# Patient Record
Sex: MALE | Race: BLACK OR AFRICAN AMERICAN | NOT HISPANIC OR LATINO | Employment: PART TIME | URBAN - NONMETROPOLITAN AREA
[De-identification: names, ages, dates, MRNs, and addresses within clinical notes are randomized per-mention and may not be internally consistent; named-entity substitution may affect disease eponyms.]

---

## 2024-02-24 ENCOUNTER — OFFICE VISIT (OUTPATIENT)
Dept: URGENT CARE | Facility: CLINIC | Age: 29
End: 2024-02-24
Payer: COMMERCIAL

## 2024-02-24 ENCOUNTER — APPOINTMENT (OUTPATIENT)
Dept: RADIOLOGY | Facility: CLINIC | Age: 29
End: 2024-02-24
Payer: COMMERCIAL

## 2024-02-24 VITALS
DIASTOLIC BLOOD PRESSURE: 75 MMHG | OXYGEN SATURATION: 97 % | RESPIRATION RATE: 18 BRPM | SYSTOLIC BLOOD PRESSURE: 123 MMHG | HEART RATE: 62 BPM | TEMPERATURE: 98 F

## 2024-02-24 DIAGNOSIS — M54.2 NECK PAIN: ICD-10-CM

## 2024-02-24 DIAGNOSIS — S16.1XXA STRAIN OF NECK MUSCLE, INITIAL ENCOUNTER: Primary | ICD-10-CM

## 2024-02-24 DIAGNOSIS — S06.0X0A CONCUSSION WITHOUT LOSS OF CONSCIOUSNESS, INITIAL ENCOUNTER: ICD-10-CM

## 2024-02-24 PROCEDURE — 72040 X-RAY EXAM NECK SPINE 2-3 VW: CPT

## 2024-02-24 PROCEDURE — G0382 LEV 3 HOSP TYPE B ED VISIT: HCPCS | Performed by: PHYSICIAN ASSISTANT
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
## 2024-02-24 NOTE — LETTER
February 24, 2024     Patient: Misty Sheehan   YOB: 1995   Date of Visit: 2/24/2024       To Whom It May Concern:    Please excuse Misty Sheehan from work until 2/27/2024. He may return sooner if symptoms improve.     If you have any questions or concerns, please don't hesitate to call.         Sincerely,        Jazmin De La Paz PA-C    CC: No Recipients

## 2024-02-24 NOTE — PATIENT INSTRUCTIONS
Aleve twice per day for pain as needed  Ice 15 minutes on/off at least 4 times per day  Tylenol for breakthrough pain  Cognitive rest  Follow up with sports medicine for concussion therapy  Follow up with PCP in 3-5 days.  Proceed to  ER if symptoms worsen.

## 2024-03-13 VITALS
DIASTOLIC BLOOD PRESSURE: 70 MMHG | SYSTOLIC BLOOD PRESSURE: 114 MMHG | HEIGHT: 72 IN | BODY MASS INDEX: 29.39 KG/M2 | WEIGHT: 217 LBS | HEART RATE: 68 BPM

## 2024-03-13 DIAGNOSIS — M54.2 NECK PAIN: Primary | ICD-10-CM

## 2024-03-13 DIAGNOSIS — S16.1XXA STRAIN OF NECK MUSCLE, INITIAL ENCOUNTER: ICD-10-CM

## 2024-03-13 PROCEDURE — 99204 OFFICE O/P NEW MOD 45 MIN: CPT | Performed by: FAMILY MEDICINE
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
## 2024-03-19 ENCOUNTER — HOSPITAL ENCOUNTER (OUTPATIENT)
Dept: MRI IMAGING | Facility: HOSPITAL | Age: 29
Discharge: HOME | End: 2024-03-19
Attending: FAMILY MEDICINE
Payer: COMMERCIAL

## 2024-03-19 DIAGNOSIS — M54.2 NECK PAIN: ICD-10-CM

## 2024-03-19 PROCEDURE — 72141 MRI NECK SPINE W/O DYE: CPT

## 2024-03-28 ENCOUNTER — TELEPHONE (OUTPATIENT)
Dept: OBGYN CLINIC | Facility: CLINIC | Age: 29
End: 2024-03-28

## 2024-03-28 DIAGNOSIS — S16.1XXA STRAIN OF NECK MUSCLE, INITIAL ENCOUNTER: Primary | ICD-10-CM

## 2024-03-28 NOTE — TELEPHONE ENCOUNTER
Please notify patient that mri of the c spine revealed findings consistent with muscle spasm. Would recommend physical therapy for the cervicval spine. Referral was placed

## 2024-05-15 ENCOUNTER — OFFICE VISIT (OUTPATIENT)
Dept: URGENT CARE | Facility: CLINIC | Age: 29
End: 2024-05-15
Payer: COMMERCIAL

## 2024-05-15 VITALS
HEIGHT: 72 IN | WEIGHT: 211.2 LBS | DIASTOLIC BLOOD PRESSURE: 65 MMHG | HEART RATE: 69 BPM | RESPIRATION RATE: 18 BRPM | OXYGEN SATURATION: 98 % | TEMPERATURE: 97.9 F | BODY MASS INDEX: 28.61 KG/M2 | SYSTOLIC BLOOD PRESSURE: 121 MMHG

## 2024-05-15 DIAGNOSIS — M47.12 SPONDYLOSIS OF CERVICAL JOINT WITH MYELOPATHY: Primary | ICD-10-CM

## 2024-05-15 PROCEDURE — G0382 LEV 3 HOSP TYPE B ED VISIT: HCPCS | Performed by: PHYSICIAN ASSISTANT

## 2024-05-15 NOTE — LETTER
May 15, 2024     Patient: Misty Sheehan   YOB: 1995   Date of Visit: 5/15/2024       To Whom it May Concern:    Misty Sheehan was seen in my clinic on 5/15/2024. He may return to work on 05/21/2024 .    If you have any questions or concerns, please don't hesitate to call.         Sincerely,          El Fontenot PA-C        CC: No Recipients
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
## 2024-06-18 ENCOUNTER — EVALUATION (OUTPATIENT)
Dept: PHYSICAL THERAPY | Facility: CLINIC | Age: 29
End: 2024-06-18
Payer: COMMERCIAL

## 2024-06-18 DIAGNOSIS — M54.2 CERVICALGIA: ICD-10-CM

## 2024-06-18 DIAGNOSIS — S16.1XXD STRAIN, CERVICAL, SUBSEQUENT ENCOUNTER: Primary | ICD-10-CM

## 2024-06-18 PROCEDURE — 97161 PT EVAL LOW COMPLEX 20 MIN: CPT | Performed by: PHYSICAL THERAPIST

## 2024-06-18 PROCEDURE — 97140 MANUAL THERAPY 1/> REGIONS: CPT | Performed by: PHYSICAL THERAPIST
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
## 2024-06-20 ENCOUNTER — OFFICE VISIT (OUTPATIENT)
Dept: PHYSICAL THERAPY | Facility: CLINIC | Age: 29
End: 2024-06-20
Payer: COMMERCIAL

## 2024-06-20 DIAGNOSIS — S16.1XXD STRAIN, CERVICAL, SUBSEQUENT ENCOUNTER: Primary | ICD-10-CM

## 2024-06-20 DIAGNOSIS — M54.2 CERVICALGIA: ICD-10-CM

## 2024-06-20 PROCEDURE — 97110 THERAPEUTIC EXERCISES: CPT | Performed by: PHYSICAL THERAPIST

## 2024-06-20 PROCEDURE — 97140 MANUAL THERAPY 1/> REGIONS: CPT | Performed by: PHYSICAL THERAPIST

## 2024-06-20 PROCEDURE — 97112 NEUROMUSCULAR REEDUCATION: CPT | Performed by: PHYSICAL THERAPIST

## 2024-06-20 NOTE — PROGRESS NOTES
"Daily Note     Today's date: 2024  Patient name: Misty Sheehan  : 1995  MRN: [id number]  Referring provider: Seymour Sen DO  Dx:   Encounter Diagnosis     ICD-10-CM    1. Strain, cervical, subsequent encounter  S16.1XXD       2. Cervicalgia  M54.2           Start Time: 1145  Stop Time: 1230  Total time in clinic (min): 45 minutes    Subjective: Patient reports fatigue entering therapy today. He notes that his neck is painful and that he continues to have difficulties with looking down and turning his head. He also finds that lifting heavier items is also difficult for him.       Objective: See treatment diary below      Assessment: Tolerated treatment fair. Patient demonstrated fatigue post treatment, exhibited good technique with therapeutic exercises, and would benefit from continued PT Patient observed in cervical flexion upon at start of clinic today. Patient remarks on increased cervical discomfort during therapy interventions today but was able to complete all interventions asked of him today. He noted greatest discomfort to the R suprascapular region throughout his session today. All passive stretching was performed to patient tolerance confirmed verbally for comfort.       Plan: Continue per plan of care.  Progress treatment as tolerated.       Precautions: None      Date  IE       FOTO 24 SC       Manuals        Cervical PROM 5 min 5 min      SOR 3 min 3 min                      Neuro Re-Ed        Cervical retraction  5\" 15x      No Monies  OTB 10\" 10x                                              Ther Ex        Cervical AROM  10x ea.       UBE for mobility and strengthening  L1 4 min fwd      Cervical isometrics  5\" 10x      Corner pec stretch  10\" 10x                              Posture education 5  min       Ther Activity                        Gait Training                        Modalities                               "

## 2024-06-26 ENCOUNTER — APPOINTMENT (OUTPATIENT)
Dept: PHYSICAL THERAPY | Facility: CLINIC | Age: 29
End: 2024-06-26
Payer: COMMERCIAL

## 2024-06-28 ENCOUNTER — OFFICE VISIT (OUTPATIENT)
Dept: PHYSICAL THERAPY | Facility: CLINIC | Age: 29
End: 2024-06-28
Payer: COMMERCIAL

## 2024-06-28 DIAGNOSIS — S16.1XXD STRAIN, CERVICAL, SUBSEQUENT ENCOUNTER: ICD-10-CM

## 2024-06-28 DIAGNOSIS — M54.2 CERVICALGIA: Primary | ICD-10-CM

## 2024-06-28 PROCEDURE — 97112 NEUROMUSCULAR REEDUCATION: CPT

## 2024-06-28 PROCEDURE — 97140 MANUAL THERAPY 1/> REGIONS: CPT

## 2024-06-28 PROCEDURE — 97110 THERAPEUTIC EXERCISES: CPT

## 2024-06-28 NOTE — PROGRESS NOTES
"Daily Note     Today's date: 2024  Patient name: Misty Sheehan  : 1995  MRN: [id number]  Referring provider: Seymour Sen DO  Dx:   Encounter Diagnosis     ICD-10-CM    1. Cervicalgia  M54.2       2. Strain, cervical, subsequent encounter  S16.1XXD           Start Time: 0715  Stop Time: 0800  Total time in clinic (min): 45 minutes    Subjective: My neck is feeling a lot better. I think looking down is the worst part.      Objective: See treatment diary below      Assessment: Tolerated treatment well. Patient would benefit from continued PT   pt reports at the start of therapy that his motion has improved in his neck especially rotation to each side.  He states that looking down bothers him most.  He was able to look down easily at his phone before we began therapy today.  When asked to go through his active motion he showed limited motion then looking down.  We continue to work on his motion and strength.,  pt states that he feels more stiffness than pain.  He had good rotation to both sides, limited motion with side bending to each side and limited with flex and ext of his cervical spine. Pt reports feeling some burning in his upper back with the corner stretch today for his pecs. Pt had good tolerance with isometrics and states that they felt good.  We worked on his neck with STM and passive motion.  He had most soreness with passive sidebending to his left and right,   pt reports feeling a lot looser post session today.       Plan: Continue per plan of care.      Precautions: None      Date  IE      FOTO 24 SC  34 JF     Manuals        Cervical PROM 5 min 5 min 5 min     SOR 3 min 3 min 3 min                     Neuro Re-Ed        Cervical retraction  5\" 15x 5 \" 15 x     No Monies  OTB 10\" 10x OTB 10\" 10 x                                             Ther Ex        Cervical AROM  10x ea.  10 x     UBE for mobility and strengthening  L1 4 min fwd L 1 4 min fwd     Cervical isometrics  5\" 10x " "5\" 5x     Corner pec stretch  10\" 10x 10 \" 10 x     Scap retractons   20x                      Posture education 5  min       Ther Activity                        Gait Training                        Modalities                                 "

## 2025-06-30 ENCOUNTER — TELEPHONE (OUTPATIENT)
Dept: FAMILY MEDICINE CLINIC | Facility: CLINIC | Age: 30
End: 2025-06-30

## 2025-06-30 ENCOUNTER — APPOINTMENT (OUTPATIENT)
Dept: RADIOLOGY | Facility: CLINIC | Age: 30
End: 2025-06-30
Payer: COMMERCIAL

## 2025-06-30 ENCOUNTER — OFFICE VISIT (OUTPATIENT)
Dept: URGENT CARE | Facility: CLINIC | Age: 30
End: 2025-06-30
Payer: COMMERCIAL

## 2025-06-30 VITALS
SYSTOLIC BLOOD PRESSURE: 118 MMHG | WEIGHT: 200 LBS | HEART RATE: 78 BPM | RESPIRATION RATE: 17 BRPM | OXYGEN SATURATION: 98 % | HEIGHT: 72 IN | BODY MASS INDEX: 27.09 KG/M2 | DIASTOLIC BLOOD PRESSURE: 56 MMHG | TEMPERATURE: 97 F

## 2025-06-30 DIAGNOSIS — G89.29 CHRONIC BILATERAL LOW BACK PAIN WITHOUT SCIATICA: Primary | ICD-10-CM

## 2025-06-30 DIAGNOSIS — M54.6 CHRONIC BILATERAL THORACIC BACK PAIN: ICD-10-CM

## 2025-06-30 DIAGNOSIS — G89.29 CHRONIC BILATERAL THORACIC BACK PAIN: ICD-10-CM

## 2025-06-30 DIAGNOSIS — M54.50 CHRONIC BILATERAL LOW BACK PAIN WITHOUT SCIATICA: ICD-10-CM

## 2025-06-30 DIAGNOSIS — M54.50 CHRONIC BILATERAL LOW BACK PAIN WITHOUT SCIATICA: Primary | ICD-10-CM

## 2025-06-30 DIAGNOSIS — G89.29 CHRONIC BILATERAL LOW BACK PAIN WITHOUT SCIATICA: ICD-10-CM

## 2025-06-30 PROCEDURE — 72070 X-RAY EXAM THORAC SPINE 2VWS: CPT

## 2025-06-30 PROCEDURE — G0383 LEV 4 HOSP TYPE B ED VISIT: HCPCS

## 2025-06-30 PROCEDURE — 72100 X-RAY EXAM L-S SPINE 2/3 VWS: CPT

## 2025-06-30 RX ORDER — BACLOFEN 10 MG/1
10 TABLET ORAL 3 TIMES DAILY PRN
Qty: 20 TABLET | Refills: 0 | Status: SHIPPED | OUTPATIENT
Start: 2025-06-30

## 2025-06-30 RX ORDER — PREDNISONE 10 MG/1
TABLET ORAL
Qty: 21 TABLET | Refills: 0 | Status: SHIPPED | OUTPATIENT
Start: 2025-06-30 | End: 2025-07-06

## 2025-06-30 RX ORDER — LIDOCAINE 50 MG/G
1 PATCH TOPICAL DAILY
Qty: 30 PATCH | Refills: 0 | Status: SHIPPED | OUTPATIENT
Start: 2025-06-30

## 2025-06-30 NOTE — TELEPHONE ENCOUNTER
Called patient left message on voicemail asking for a return call to reschedule patient appointment.  Patient is scheduled in a Nurse Visit appointment slot and needs to be rescheduled as a New Patient to Establish Care.    reduce pain, improve ambulation F/U with Dr Miller after discharge from rehab.  Dressing changes as needed.  D/C staples/sutures POD#14 (7/15/18).  Physical therapy ambulation FFWB (flat foot weight bearing).  Lovenox x 6 weeks for DVT prophylaxis.

## 2025-06-30 NOTE — PATIENT INSTRUCTIONS
Take the prednisone daily with food. DO NOT take NSAID medication while taking prednisone (no advil, aleve, motrin, ibuprofen, naproxen, celebrex or diclofenac). You may take TYLENOL as needed and according to package directions     Topical pain agent of choice and apply as directed (lidocaine patches, salonpas, biofreeze, etc).     Warm moist compress to the lower back every 2-3 hours for 15 minutes each application. Wash the low back with soap and water if you applied any topical pain agents before you apply heat to avoid the potential of a skin burn.     Follow up with the comprehensive spine program for further assistance. They will call you within the next few days to schedule an appt with you.    If you develop intolerable pain, you begin to have trouble with urination or moving your bowels (if you are unable to go or you lose control) you will need to call 911 or go to the closest emergency room for further evaluation as this could indicate a potential emergency

## 2025-06-30 NOTE — LETTER
June 30, 2025     Patient: Misty Sheehan  YOB: 1995  Date of Visit: 6/30/2025      To Whom it May Concern:    Misty Sheehan is under my professional care. Misty was seen in my office on 6/30/2025. Misty . Please excuse work absences from 6/27 and 6/28 as he was home ill.    If you have any questions or concerns, please don't hesitate to call.         Sincerely,          JAMES Davis        CC: No Recipients
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
## 2025-07-01 ENCOUNTER — TELEPHONE (OUTPATIENT)
Dept: PHYSICAL THERAPY | Facility: OTHER | Age: 30
End: 2025-07-01

## 2025-07-01 NOTE — TELEPHONE ENCOUNTER
Call placed to the patient per [facility] referral.    Call would not connect.    This is the 1st attempt to reach the patient.  Will defer per protocol.

## 2025-07-03 NOTE — TELEPHONE ENCOUNTER
Call placed to the patient per [facility] referral.     I did not hear the phone ringing, Call would not connect.     This is the 2nd  attempt to reach the patient.  Will defer per protocol.

## 2025-07-22 ENCOUNTER — OFFICE VISIT (OUTPATIENT)
Dept: URGENT CARE | Facility: CLINIC | Age: 30
End: 2025-07-22
Payer: COMMERCIAL

## 2025-07-22 ENCOUNTER — NURSE TRIAGE (OUTPATIENT)
Dept: PHYSICAL THERAPY | Facility: OTHER | Age: 30
End: 2025-07-22

## 2025-07-22 VITALS
BODY MASS INDEX: 27.09 KG/M2 | RESPIRATION RATE: 18 BRPM | TEMPERATURE: 98.2 F | HEIGHT: 72 IN | OXYGEN SATURATION: 99 % | WEIGHT: 200 LBS | HEART RATE: 66 BPM | SYSTOLIC BLOOD PRESSURE: 112 MMHG | DIASTOLIC BLOOD PRESSURE: 69 MMHG

## 2025-07-22 DIAGNOSIS — G89.29 CHRONIC BILATERAL BACK PAIN, UNSPECIFIED BACK LOCATION: Primary | ICD-10-CM

## 2025-07-22 DIAGNOSIS — M54.9 CHRONIC BILATERAL BACK PAIN, UNSPECIFIED BACK LOCATION: Primary | ICD-10-CM

## 2025-07-22 PROCEDURE — S9083 URGENT CARE CENTER GLOBAL: HCPCS

## 2025-07-22 PROCEDURE — G0382 LEV 3 HOSP TYPE B ED VISIT: HCPCS

## 2025-07-22 RX ORDER — CELECOXIB 200 MG/1
200 CAPSULE ORAL 2 TIMES DAILY
Qty: 14 CAPSULE | Refills: 0 | Status: SHIPPED | OUTPATIENT
Start: 2025-07-22 | End: 2025-07-29
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
## 2025-07-22 NOTE — LETTER
July 22, 2025     Patient: Misty Sheehan   YOB: 1995   Date of Visit: 7/22/2025       To Whom it May Concern:    Misty Sheehan was seen in my clinic on 7/22/2025. He may return to work on 7/25/25.    If you have any questions or concerns, please don't hesitate to call.         Sincerely,          Angela Lombardo, CRNP        CC: No Recipients
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
## 2025-07-22 NOTE — TELEPHONE ENCOUNTER
Called the patient to complete the triage process started today @ 2:55 pm. Call went to .    Voice message left for patient to call back. Phone number and hours of business provided.     Triage can be completed when a call back is received.

## 2025-07-22 NOTE — PATIENT INSTRUCTIONS
Take celebrex as prescribed   See [facility] as referred  Do not take celebrex with other NSAIDs  Do not drive or operate heavy machinery while taking baclofen  Rest (for no longer than 24 hours)  Stretching exercises  Alternate ice and heat    Follow up with PCP in 3-5 days.  Proceed to  ER if symptoms worsen.    If tests are performed, our office will contact you with results only if changes need to made to the care plan discussed with you at the visit. You can review your full results on [facility] MyChart.

## 2025-08-14 ENCOUNTER — OFFICE VISIT (OUTPATIENT)
Dept: FAMILY MEDICINE CLINIC | Facility: CLINIC | Age: 30
End: 2025-08-14
Payer: COMMERCIAL

## 2025-08-14 PROBLEM — F32.2 SEVERE MAJOR DEPRESSIVE DISORDER (HCC): Status: ACTIVE | Noted: 2025-08-14
